# Patient Record
Sex: MALE
[De-identification: names, ages, dates, MRNs, and addresses within clinical notes are randomized per-mention and may not be internally consistent; named-entity substitution may affect disease eponyms.]

---

## 2017-03-29 ENCOUNTER — HOSPITAL ENCOUNTER (EMERGENCY)
Dept: HOSPITAL 14 - H.ER | Age: 32
Discharge: HOME | End: 2017-03-29
Payer: MEDICAID

## 2017-03-29 VITALS
TEMPERATURE: 98.4 F | RESPIRATION RATE: 20 BRPM | SYSTOLIC BLOOD PRESSURE: 112 MMHG | DIASTOLIC BLOOD PRESSURE: 76 MMHG | OXYGEN SATURATION: 98 % | HEART RATE: 87 BPM

## 2017-03-29 VITALS — BODY MASS INDEX: 33 KG/M2

## 2017-03-29 DIAGNOSIS — M54.9: Primary | ICD-10-CM

## 2017-03-29 NOTE — ED PDOC
HPI: Back


Time Seen by Provider: 03/29/17 12:25


Chief Complaint (Nursing): Back Pain


Chief Complaint (Provider): Back pain


History Per: Patient


History/Exam Limitations: no limitations


Onset/Duration Of Symptoms: Days (x2 weeks)


Current Symptoms Are (Timing): Still Present


Additional Complaint(s): 


32 y/o male who presents to the emergency department with a complaint of a back 

pain x2 weeks. Denies fever, chills, nausea, vomiting, or complications with 

urination.





Of note, previous CT scan show that patient has a herniated disc. 


 





Past Medical History


Reviewed: Historical Data, Nursing Documentation, Vital Signs


Vital Signs: 


 Last Vital Signs











Temp  98.4 F   03/29/17 10:52


 


Pulse  87   03/29/17 10:52


 


Resp  20   03/29/17 10:52


 


BP  112/76   03/29/17 10:52


 


Pulse Ox  98   03/29/17 10:52














- Medical History


PMH: No Chronic Diseases


   Denies: Chronic Kidney Disease





- Family History


Family History: States: Unknown Family Hx





- Social History


Current smoker - smoking cessation education provided: Yes (Light Smoker < 10 

Cigarettes Daily)


Alcohol: None


Drugs: Denies





- Immunization History


Hx Tetanus Toxoid Vaccination: Yes (couple months ago)


Hx Influenza Vaccination: Yes


Hx Pneumococcal Vaccination: Yes





- Home Medications


Home Medications: 


 Ambulatory Orders











 Medication  Instructions  Recorded


 


Pseudoephedrine HCl [Sudafed] 60 mg PO Q6 #30 tablet 01/11/17


 


Cyclobenzaprine [Cyclobenzaprine 10 mg PO BID #14 tab 03/29/17





HCl]  


 


Ketorolac Tromethamine [Toradol] 10 mg PO TID #20 tab 03/29/17














- Allergies


Allergies/Adverse Reactions: 


 Allergies











Allergy/AdvReac Type Severity Reaction Status Date / Time


 


No Known Allergies Allergy   Verified 03/29/17 10:51














Review of Systems


ROS Statement: Except As Marked, All Systems Reviewed And Found Negative


Constitutional: Negative for: Fever, Chills


Gastrointestinal: Negative for: Nausea, Vomiting


Genitourinary Male: Negative for: Dysuria, Frequency, Hematuria


Musculoskeletal: Positive for: Back Pain





Physical Exam





- Reviewed


Nursing Documentation Reviewed: Yes


Vital Signs Reviewed: Yes





- Physical Exam


Appears: Positive for: Non-toxic, No Acute Distress


Head Exam: Positive for: ATRAUMATIC, NORMOCEPHALIC


Skin: Positive for: Normal Color, Warm, Dry


Back: Positive for: L CVA Tenderness, R CVA Tenderness.  Negative for: Other (

No midline tenderness)


Neurologic/Psych: Positive for: Alert, Oriented





- Laboratory Results


Urine dip results: Negative for: Leukocyte Esterase, Blood, Nitrate, Ketones, 

Glucose, Bilirubin, Protein





- ECG


O2 Sat by Pulse Oximetry: 98 (RA)


Pulse Ox Interpretation: Normal





Medical Decision Making


Medical Decision Making: 


Time: 12:25


Initial impression: Exacerbation of herniated disc





Initial plan: 


--ED Urine Dipstick (POC)


--Flexeril 10 mg PO


--Toradol 30 mg IM


--Revaluation








Time: 12:45





Upon provider reevaluation patient is feeling better, is medically stable, and 

requires no further treatment in the ED at this time. Patient will be 

discharged home. Counseling was provided and all questions were answered 

regarding diagnosis and need for follow up with pain management referrals. 

There is agreement to discharge plan. Return if symptoms persist or worsen.











Scribe Attestation:


Documented by Sarah Leos, acting as a scribe for Marci Bell PA-C.





Provider Scribe Attestation:


All medical record entries made by the Scribe were at my direction and 

personally dictated by me. I have reviewed the chart and agree that the record 

accurately reflects my personal performance of the history, physical exam, 

medical decision making, and the department course for this patient. I have 

also personally directed, reviewed, and agree with the discharge instructions 

and disposition.











Disposition





- Clinical Impression


Clinical Impression: 


 Back pain





- Patient ED Disposition


Is Patient to be Admitted: No


Counseled Patient/Family Regarding: Studies Performed, Diagnosis, Need For 

Followup, Rx Given





- Disposition


Referrals: 


[facility] [Provider Group]


[facility] [Provider Group]


 Service [Outside]


Antione Hale MD [IM] - 


Disposition: Routine/Home


Disposition Time: 13:23


Condition: STABLE


Prescriptions: 


Cyclobenzaprine [Cyclobenzaprine HCl] 10 mg PO BID #14 tab


Ketorolac Tromethamine [Toradol] 10 mg PO TID #20 tab


Instructions:  Lumbar Disc Herniation (ED)

## 2017-08-22 ENCOUNTER — HOSPITAL ENCOUNTER (EMERGENCY)
Dept: HOSPITAL 42 - ED | Age: 32
Discharge: HOME | End: 2017-08-22
Payer: MEDICAID

## 2017-08-22 VITALS
TEMPERATURE: 98.6 F | OXYGEN SATURATION: 96 % | SYSTOLIC BLOOD PRESSURE: 134 MMHG | RESPIRATION RATE: 18 BRPM | HEART RATE: 79 BPM | DIASTOLIC BLOOD PRESSURE: 53 MMHG

## 2017-08-22 VITALS — BODY MASS INDEX: 33 KG/M2

## 2017-08-22 DIAGNOSIS — Z11.4: Primary | ICD-10-CM
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
## 2018-02-04 ENCOUNTER — HOSPITAL ENCOUNTER (EMERGENCY)
Dept: HOSPITAL 42 - ED | Age: 33
LOS: 1 days | Discharge: HOME | End: 2018-02-05
Payer: COMMERCIAL

## 2018-02-04 VITALS — BODY MASS INDEX: 38.7 KG/M2

## 2018-02-04 DIAGNOSIS — M79.672: ICD-10-CM

## 2018-02-04 DIAGNOSIS — F17.210: ICD-10-CM

## 2018-02-04 DIAGNOSIS — J11.1: Primary | ICD-10-CM

## 2018-02-04 DIAGNOSIS — K85.90: ICD-10-CM

## 2018-02-04 LAB
ALBUMIN SERPL-MCNC: 4.1 G/DL (ref 3–4.8)
ALBUMIN/GLOB SERPL: 1.3 {RATIO} (ref 1.1–1.8)
ALT SERPL-CCNC: 27 U/L (ref 7–56)
AST SERPL-CCNC: 27 U/L (ref 17–59)
BASOPHILS # BLD AUTO: 0.02 K/MM3 (ref 0–2)
BASOPHILS NFR BLD: 0.4 % (ref 0–3)
BUN SERPL-MCNC: 9 MG/DL (ref 7–21)
CALCIUM SERPL-MCNC: 9.5 MG/DL (ref 8.4–10.5)
EOSINOPHIL # BLD: 0.1 10*3/UL (ref 0–0.7)
EOSINOPHIL NFR BLD: 0.9 % (ref 1.5–5)
ERYTHROCYTE [DISTWIDTH] IN BLOOD BY AUTOMATED COUNT: 12.6 % (ref 11.5–14.5)
GFR NON-AFRICAN AMERICAN: > 60
GRANULOCYTES # BLD: 3.91 10*3/UL (ref 1.4–6.5)
GRANULOCYTES NFR BLD: 72.1 % (ref 50–68)
HGB BLD-MCNC: 14.8 G/DL (ref 14–18)
LIPASE SERPL-CCNC: 455 U/L (ref 23–300)
LYMPHOCYTES # BLD: 0.9 10*3/UL (ref 1.2–3.4)
LYMPHOCYTES NFR BLD AUTO: 17 % (ref 22–35)
MCH RBC QN AUTO: 33.6 PG (ref 25–35)
MCHC RBC AUTO-ENTMCNC: 34.5 G/DL (ref 31–37)
MCV RBC AUTO: 97.3 FL (ref 80–105)
MONOCYTES # BLD AUTO: 0.5 10*3/UL (ref 0.1–0.6)
MONOCYTES NFR BLD: 9.6 % (ref 1–6)
PLATELET # BLD: 212 10^3/UL (ref 120–450)
PMV BLD AUTO: 10 FL (ref 7–11)
RBC # BLD AUTO: 4.41 10^6/UL (ref 3.5–6.1)
TROPONIN I SERPL-MCNC: < 0.01 NG/ML
WBC # BLD AUTO: 5.4 10^3/UL (ref 4.5–11)

## 2018-02-04 PROCEDURE — 96374 THER/PROPH/DIAG INJ IV PUSH: CPT

## 2018-02-04 PROCEDURE — 83615 LACTATE (LD) (LDH) ENZYME: CPT

## 2018-02-04 PROCEDURE — 73630 X-RAY EXAM OF FOOT: CPT

## 2018-02-04 PROCEDURE — 96361 HYDRATE IV INFUSION ADD-ON: CPT

## 2018-02-04 PROCEDURE — 80053 COMPREHEN METABOLIC PANEL: CPT

## 2018-02-04 PROCEDURE — 83690 ASSAY OF LIPASE: CPT

## 2018-02-04 PROCEDURE — 71045 X-RAY EXAM CHEST 1 VIEW: CPT

## 2018-02-04 PROCEDURE — 93005 ELECTROCARDIOGRAM TRACING: CPT

## 2018-02-04 PROCEDURE — 80320 DRUG SCREEN QUANTALCOHOLS: CPT

## 2018-02-04 PROCEDURE — 84484 ASSAY OF TROPONIN QUANT: CPT

## 2018-02-04 PROCEDURE — 74177 CT ABD & PELVIS W/CONTRAST: CPT

## 2018-02-04 PROCEDURE — 87804 INFLUENZA ASSAY W/OPTIC: CPT

## 2018-02-04 PROCEDURE — 82550 ASSAY OF CK (CPK): CPT

## 2018-02-04 PROCEDURE — 99283 EMERGENCY DEPT VISIT LOW MDM: CPT

## 2018-02-04 PROCEDURE — 85025 COMPLETE CBC W/AUTO DIFF WBC: CPT

## 2018-02-04 PROCEDURE — 96375 TX/PRO/DX INJ NEW DRUG ADDON: CPT

## 2018-02-04 NOTE — ED PDOC
Arrival/HPI





- General


Chief Complaint: Chest Pain


Time Seen by Provider: 02/04/18 22:34


Historian: Patient





- History of Present Illness


Narrative History of Present Illness (Text): 





02/04/18 22:46


31 y/o male, no significant pmh, nkda, c/o epigastric pain with nausea and 

vomiting x 6 hours.  Pt. stated that he has not been eating entire day, noted 

to have epigastric pain, no coughing or shortness of breath, no pain medication 

taken at home.  Pt. also complaining about the lt. foot pain which has been 

going on for the past 3 weeks, initially seen at another ER and has scheduled 

follow up to see the orthopedic podiatrist tomorrow, no calf pain, no numbness 

or tingling, no rash, no palpitation, no other medical or psychological 

complaints. 





Past Medical History





- Provider Review


Nursing Documentation Reviewed: Yes





- Past History


Past History: Non-Contributing





- Infectious Disease


Hx of Infectious Diseases: None





- Tetanus Immunization


Tetanus Immunization: >10 years Ago





- Past Medical History


Past Medical History: No Previous





- Cardiac


Hx Cardiac Disorders: No





- Pulmonary


Hx Respiratory Disorders: No





- Neurological


Hx Neurological Disorder: No





- HEENT


Hx HEENT Disorder: No





- Renal


Hx Renal Disorder: No





- Endocrine/Metabolic


Hx Endocrine Disorders: No





- Hematological/Oncological


Hx Blood Disorders: No





- Integumentary


Hx Dermatological Disorder: No





- Musculoskeletal/Rheumatological


Hx Back Pain: Yes





- Gastrointestinal


Hx Gastrointestinal Disorders: No





- Genitourinary/Gynecological


Hx Genitourinary Disorders: No





- Psychiatric


Hx Psychophysiologic Disorder: No


Hx Substance Use: No





- Surgical History


Hx Musculoskeletal Surgery: Yes (screw in Left foot)


Other/Comment: screws in left foot, plate in right hand





- Anesthesia


Hx Anesthesia: Yes


Hx Anesthesia Reactions: No


Hx Malignant Hyperthermia: No





- Suicidal Assessment


Feels Threatened In Home Enviroment: No





Family/Social History





- Physician Review


Nursing Documentation Reviewed: Yes


Family/Social History: Unknown Family HX


Smoking Status: Light Smoker < 10 Cigarettes Daily


Hx Alcohol Use: No


Hx Substance Use: No





Allergies/Home Meds


Allergies/Adverse Reactions: 


Allergies





avocado Allergy (Verified 02/04/18 22:27)


 ANAPHYLAXIS


latex Allergy (Verified 02/04/18 22:27)


 SWELLING











Review of Systems





- Review of Systems


Constitutional: absent: Fatigue, Fevers


Eyes: absent: Vision Changes


ENT: absent: Hearing Changes


Respiratory: absent: SOB, Cough


Cardiovascular: absent: Chest Pain


Gastrointestinal: Abdominal Pain, Nausea, Vomiting.  absent: Diarrhea


Musculoskeletal: Arthralgias.  absent: Back Pain, Myalgias


Skin: absent: Rash, Pruritis


Neurological: absent: Headache, Dizziness


Psychiatric: absent: Anxiety, Depression





Physical Exam


Vital Signs Reviewed: Yes


Vital Signs











  Temp Pulse Resp BP Pulse Ox


 


 02/05/18 02:33  99 F    


 


 02/05/18 02:11   80  16  126/61  96


 


 02/04/18 22:37  100.4 F H  100 H  18  133/79  97











Temperature: Febrile


Blood Pressure: Normal


Pulse: Tachycardic


Respiratory Rate: Normal


Appearance: Positive for: Well-Appearing, Non-Toxic, Comfortable


Pain Distress: Moderate


Mental Status: Positive for: Alert and Oriented X 3





- Systems Exam


Head: Present: Atraumatic, Normocephalic


Pupils: Present: PERRL


Extroacular Muscles: Present: EOMI


Conjunctiva: Present: Normal


Mouth: Present: Moist Mucous Membranes


Neck: Present: Normal Range of Motion


Respiratory/Chest: Present: Clear to Auscultation, Good Air Exchange.  No: 

Respiratory Distress, Accessory Muscle Use


Cardiovascular: Present: Regular Rate and Rhythm, Normal S1, S2.  No: Murmurs


Abdomen: Present: Tenderness (+epigastric tenderness, no cva tenderness), 

Normal Bowel Sounds.  No: Distention, Peritoneal Signs, Rebound, Guarding


Back: Present: Normal Inspection


Upper Extremity: Present: Normal Inspection.  No: Cyanosis, Edema


Lower Extremity: Present: Normal Inspection, Other (Lt. foot/ankle: +ttp on the 

lt. dorsum foot with no swelling, no ankle tenderness or swelling, negative 

eder and tena signs, FROM without limitation, sensation intact, motor 5/5, 

+DPPT pulses, capillary refill< 2 seconds, neurovascular intact. ).  No: Edema


Neurological: Present: GCS=15, CN II-XII Intact, Speech Normal


Skin: Present: Warm, Dry, Normal Color.  No: Rashes


Psychiatric: Present: Alert, Oriented x 3, Normal Insight, Normal Concentration





Medical Decision Making


ED Course and Treatment: 





02/04/18 22:50


-labs/cardiac enzyme


-CT abdomen and pelvis


-Lt. foot xray


-IVF/pepcid/zofran/tylenol/toradol


-Observe and reassess








02/05/18 01:09


-Chest xray show no active disease


-Lt. foot show no acute fracture or dislocation


-CT abdomen and pelvis show No evidence of a significant acute process. 

Findings suspicious for a portosystemic collateral vessel, and cannot exclude 

portosystemic shunting due to portal hypertension. CT appearance of the liver 

is within normal limits. Recommend clinical correlation.


-Labs show no acute findings except lipase 455, morphine 4mg IV ordered as he 

still has epigastric pain.


-Rapid flu show is positive, tamiflu ordered. 





02/05/18 02:23


-Posterior splint applied by me with neurovascular intact, crutches ordered.


-Pain well controlled and the patient request to be discharged home with 

outpatient follow up as he doesn't want to be admitted. 


-I discussed with the patient about the light and non-greasy food diet plus 

outpatient GI follow up, stop all alcohol drinks.


-I checked the [facility] report and there is no prescription for narcotics for this 

patient. Pt. stated that he has appointment with his podiatrist tomorrow and he 

will follow up. 


-Discharge home with posterior splint, crutches, Tamiflu, motrin, pepcid, zofran

, percocet, avoid alcohol and non-greasy food, follow up with your own pmd and  

GI within 2 days, return to the ER for any new or worsening signs or symptoms. 








I educated the patient about the diet as well including but not limited to





Foods to limit include:





red meat


organ meats


fried foods


fries and potato chips


mayonnaise


margarine and butter


full-fat dairy


pastries and desserts with added sugars


beverages with added sugars


If youre trying to combat pancreatitis, your focus needs to be on avoiding 

trans fatty acids in your diet. Foods that are fried or heavily processed, like 

french fries and fast food hamburgers, are some of the worst offenders. Organ 

meats, full-fat dairy, potato chips, and mayonnaise also top the list of foods 

to limit.





Anything that has been cooked or deep-fried might trigger a flare-up of 

pancreatitis. Youll want to cut way back on the refined flour thats in cakes, 

pastries, and cookies. These food items can tax the digestive system by causing 

your insulin levels to spike, which can lead to diabetes.





RECOVERY DIET


Pancreatitis recovery diet


If youre recovering from acute or chronic pancreatitis, you should avoid 

drinking alcohol. If you smoke, youll also need to quit. You should continue to 

focus on eating a low-fat diet that wont tax or inflame your pancreas. You 

should also focus on staying hydrated, keeping an electrolyte beverage or a 

bottle of water with you at all times. If youve been hospitalized due to a 

pancreatitis flare-up, your doctor will probably refer you to a dietitian to 

help you learn how to change your eating habits permanently.





People with chronic pancreatitis often experience malnutrition due to their 

decreased pancreas function. Vitamins A, D, E, and K are most commonly found to 

be lacking as a result of pancreatitis.





Diet tips


Always check with your doctor or dietician before changing your eating habits 

when you have pancreatitis. Here are some tips they might suggest:





Eat between six and eight small meals throughout the day to help recover from 

pancreatitis. This is easier on your digestive system than eating two or three 

large meals.


Use MCTs as your primary fat since this type of fat does not require pancreatic 

enzymes to be digested. MCTs can be found in coconut and palm kernel oils and 

is available at most health food stores.


Avoid eating too much fiber at once, as this can slow digestion and result in 

less-than-ideal absorption of nutrients from food. Fiber may also make your 

limited amount of enzymes less effective.


Take a multivitamin supplement to ensure that youre getting the nutrition you 

need, regardless of the ways you might have to restrict your eating.





CAUSES


Causes of pancreatitis


The most common cause of chronic pancreatitis is drinking too much alcohol, 

according to the [place] Department of Health and Human Services. Pancreatitis can 

also be genetic, or the symptom of an autoimmune reaction. In many cases of 

acute pancreatitis, the condition is triggered by a blocked bile duct or 

gallstones.








TREATMENT


Other treatments for pancreatitis


If your pancreas has been damaged by pancreatitis, a change in your diet will 

help you to feel better. But it might not be enough to restore the function of 

the pancreas completely. Your doctor may prescribe supplemental or synthetic 

pancreatic enzymes for you to take with every meal. If youre still experiencing 

pain from chronic pancreatitis, you might want to try an alternative therapy 

such as yoga or acupuncture to supplement your doctors prescribed pancreatitis 

treatment. An endoscopic ultrasound or a surgery might be recommended as the 

next course of action if your pain continues.




















- Lab Interpretations


Lab Results: 








 02/04/18 22:45 





 02/04/18 22:45 





 Lab Results





02/05/18 00:50: Influenza Typ A,B (EIA) Pos for influenza a H


02/04/18 22:45: WBC 5.4, RBC 4.41, Hgb 14.8, Hct 42.9, MCV 97.3, MCH 33.6, MCHC 

34.5, RDW 12.6, Plt Count 212, MPV 10.0, Gran % 72.1 H, Lymph % (Auto) 17.0 L, 

Mono % (Auto) 9.6 H, Eos % (Auto) 0.9 L, Baso % (Auto) 0.4, Gran # 3.91, Lymph 

# (Auto) 0.9 L, Mono # (Auto) 0.5, Eos # (Auto) 0.1, Baso # (Auto) 0.02


02/04/18 22:45: Alcohol, Quantitative < 10


02/04/18 22:45: Sodium 138, Potassium 3.9, Chloride 103, Carbon Dioxide 24, 

Anion Gap 15, BUN 9, Creatinine 0.8, Est GFR (African Amer) > 60, Est GFR (Non-

Af Amer) > 60, Random Glucose 102, Calcium 9.5, Total Bilirubin 0.5, AST 27, 

ALT 27, Alkaline Phosphatase 51, Lactate Dehydrogenase 494, Total Creatine 

Kinase 89, Troponin I < 0.01, Total Protein 7.3, Albumin 4.1, Globulin 3.2, 

Albumin/Globulin Ratio 1.3, Lipase 455 H











- RAD Interpretation


Radiology Orders: 








02/04/18 22:39


CHEST PORTABLE [RAD] Stat 


FOOT LEFT 3 VIEWS ROUTINE [RAD] Stat 





02/04/18 22:41


ABD & PELVIS IV CONTRAST ONLY [CT] Stat 











Chest xray: no active disease


--------------------------------------------------------------------------------

--


Lt. foot xray: no acute findings


--------------------------------------------------------------------------------

---


CT abdomen and pelvis: 





FINDINGS:


LOWER THORAX: Heart appears mildly prominent in size for age. Lung bases are 

clear.


ABDOMEN:


LIVER: Within normal limits in appearance.


GALLBLADDER AND BILE DUCTS: No CT evidence of acute cholecystitis. No evidence 

of


significant biliary ductal dilatation.


PANCREAS: No CT evidence of acute pancreatitis.


SPLEEN: No acute abnormality of the spleen identified.


ADRENALS: No acute abnormality of the adrenal glands identified.


KIDNEYS AND URETERS: No acute abnormality of the kidneys identified. No 

evidence of


significant hydrouereteronephrosis.


LAURI JORGE Accession: [id number] MRN:[id number] | Final Radiology 

Report


CONFIDENTIALITY STATEMENT


This report is intended only for use by the referring physician, and only in 

accordance with law. If you received this in error, call [phone].


Page 2 of 2


STOMACH AND BOWEL: Retained stool noted throughout the colon. Bowel is otherwise


unremarkable in appearance. No evidence of fecal impaction. No evidence of 

bowel obstruction.


APPENDIX: Appendix is seen, and is within normal limits in appearance.


PELVIS:


BLADDER: No acute abnormality of the bladder identified.


REPRODUCTIVE: No acute abnormality of the reproductive organs is seen.


ABDOMEN and PELVIS:


INTRAPERITONEAL SPACE: No evidence of free intraperitoneal air or fluid.


BONES/JOINTS: No acute fractures or other acute bony abnormality noted.


SOFT TISSUES: No acute abnormality of the visualized soft tissues is seen.


VASCULATURE: A prominent vessel is seen in the upper abdomen, which extends 

from the main


portal vein to the left renal vein. This is suspicious for a portosystemic 

collateral vessel. The liver


does not appear significantly cirrhotic. The portal vein is patent.


LYMPH NODES: No evidence of diffuse lymphadenopathy.


IMPRESSION:


- No evidence of a significant acute process.


- Findings suspicious for a portosystemic collateral vessel, and cannot exclude 

portosystemic


shunting due to portal hypertension. CT appearance of the liver is within 

normal limits. Recommend


clinical correlation.


- See above for remaining findings.


Thank you for allowing us to participate in the care of your patient.


Dictated and Authenticated by: Marylou Elam MD


02/05/2018 12:39 AM Eastern Time ([place] & [place])


: Radiologist





- Medication Orders


Current Medication Orders: 











Discontinued Medications





Acetaminophen (Tylenol 325mg Tab)  650 mg PO STAT STA


   Stop: 02/04/18 22:50


   Last Admin: 02/04/18 22:58  Dose: 650 mg





MAR Pain/Vitals


 Document     02/04/18 22:58  RD  (Rec: 02/04/18 22:58  RD  [id number])


     Pain Reassessment


      Is This A Pain ReAssessment?               No


     Sleep


      Is patient sleeping during reassessment?   No


     Presence of Pain


      Presence of Pain                           Yes





Famotidine (Pepcid)  20 mg IVP STAT STA


   Stop: 02/04/18 22:40


   Last Admin: 02/04/18 22:58  Dose: 20 mg





IVP Administration


 Document     02/04/18 22:58  RD  (Rec: 02/04/18 22:58  RD  [id number])


     Charges for Administration


      # of IVP Administrations                   1





Sodium Chloride (Sodium Chloride 0.9%)  1,000 mls @ 999 mls/hr IV .Q1H1M STA


   Stop: 02/04/18 23:40


   Last Admin: 02/04/18 22:45  Dose: 999 mls/hr





eMAR Start Stop


 Document     02/04/18 22:45  RD  (Rec: 02/04/18 23:00  RD  [id number])


     Intravenous Solution


      Start Date                                 02/04/18


      Start Time                                 23:00


      End Date                                   02/05/18


      End time                                   00:00


      Total Infusion Time                        60





Ketorolac Tromethamine (Toradol)  30 mg IVP STAT STA


   Stop: 02/04/18 22:41


   Last Admin: 02/04/18 22:59  Dose: 30 mg





MAR Pain Assessment


 Document     02/04/18 22:59  RD  (Rec: 02/04/18 22:59  RD  [id number])


     Pain Reassessment


      Is this a pain reassessment?               No


     Sleep


      Is patient sleeping during reassessment?   No


     Presence of Pain


      Presence of Pain                           Yes


IVP Administration


 Document     02/04/18 22:59  RD  (Rec: 02/04/18 22:59  RD  [id number])


     Charges for Administration


      # of IVP Administrations                   1





Morphine Sulfate (Morphine)  4 mg IVP STAT STA


   Stop: 02/05/18 00:53


   Last Admin: 02/05/18 01:51  Dose: 4 mg





IVP Administration


 Document     02/05/18 01:51  RD  (Rec: 02/05/18 01:52  RD  [id number])


     Charges for Administration


      # of IVP Administrations                   1





Ondansetron HCl (Zofran Inj)  4 mg IVP STAT STA


   Stop: 02/04/18 22:41


   Last Admin: 02/04/18 22:54  Dose: 4 mg





IVP Administration


 Document     02/04/18 22:54  RD  (Rec: 02/04/18 22:59  RD  [id number])


     Charges for Administration


      # of IVP Administrations                   1





Oseltamivir Phosphate (Tamiflu Cap)  75 mg PO BID PAULA


   PRN Reason: Protocol


   Stop: 02/10/18 01:18


   Last Admin: 02/05/18 01:52  Dose: 75 mg











- PA / NP / Resident Statement


MD/DO has reviewed & agrees with the documentation as recorded.





Disposition/Present on Arrival





- Present on Arrival


Any Indicators Present on Arrival: Yes


History of DVT/PE: No


History of Uncontrolled Diabetes: No


Urinary Catheter: No


History of Decub. Ulcer: No


History Surgical Site Infection Following: None





- Disposition


Have Diagnosis and Disposition been Completed?: Yes


Diagnosis: 


 Pancreatitis, Foot pain, Influenza





Disposition: HOME/ ROUTINE


Disposition Time: 02:02


Patient Plan: Discharge


Condition: IMPROVED


Discharge Instructions (ExitCare):  Pancreatitis (ED)


Print Language: ENGLISH


Additional Instructions: 


-Discharge home with Tamiflu, motrin, pepcid, zofran, percocet, avoid alcohol 

and non-greasy food, follow up with your own pmd and GI within 2 days, return 

to the ER for any new or worsening signs or symptoms. 


Prescriptions: 


Famotidine [Pepcid] 20 mg PO BID #20 tab


Ibuprofen [Motrin] 600 mg PO QID PRN #24 tab


 PRN Reason: Other


Ondansetron HCl [Zofran] 4 mg PO QID PRN #12 tablet


 PRN Reason: Other


Oseltamivir Phosphate [Tamiflu] 75 mg PO BID #10 capsule


oxyCODONE/Acetaminophen [Percocet 5/325 mg Tab] 1 tab PO QID PRN #12 tab


 PRN Reason: Other


Referrals: 


Lavelle Webb MD [Primary Care Provider] - Follow up with primary


Rafal Teague MD [Staff Provider] - Follow up with primary


Olivier Campo III, MD [Medical Doctor] - Follow up with primary


Arloro,Vincent, DPM [Staff Provider] - Follow up with primary


Forms:  WORK NOTE

## 2018-02-05 VITALS
RESPIRATION RATE: 16 BRPM | SYSTOLIC BLOOD PRESSURE: 126 MMHG | OXYGEN SATURATION: 96 % | HEART RATE: 80 BPM | DIASTOLIC BLOOD PRESSURE: 61 MMHG

## 2018-02-05 VITALS — TEMPERATURE: 99 F

## 2018-02-05 NOTE — RAD
HISTORY:

epigastric pain  



COMPARISON:

01/11/2017 



FINDINGS:



LUNGS:

No active pulmonary disease.



PLEURA:

No significant pleural effusion identified, no pneumothorax apparent.



CARDIOVASCULAR:

Normal.



OSSEOUS STRUCTURES:

No significant abnormalities.



VISUALIZED UPPER ABDOMEN:

Normal.



OTHER FINDINGS:

None.



IMPRESSION:

No active disease.

## 2018-02-05 NOTE — CARD
--------------- APPROVED REPORT --------------





EKG Measurement

Heart [id number]

[id number]

[id number]

[id number]

[id number]



<Conclusion>

Normal sinus rhythm

Normal ECG

## 2018-02-05 NOTE — CT
EXAM:

  CT Abdomen and Pelvis With Intravenous Contrast



EXAM DATE/TIME:

  2/4/2018 10:41 PM



CLINICAL HISTORY:

  32 years old, male; Pain; Abdominal pain; Generalized; Additional info: Upper 

abdominal pain



TECHNIQUE:

  Axial computed tomography images of the abdomen and pelvis with intravenous 

contrast.  All CT scans at this facility use one or more dose reduction 

techniques, viz.: automated exposure control; ma/kV adjustment per patient size 

(including targeted exams where dose is matched to indication; i.e. head); or 

iterative reconstruction technique.

  Coronal and sagittal reformatted images were created and reviewed.



CONTRAST:

  100 mL of OMNI 350 administered intravenously.



COMPARISON:

  No relevant prior studies available.



FINDINGS:

  LOWER THORAX:  Heart appears mildly prominent in size for age. Lung bases are 

clear.



 ABDOMEN:

  LIVER:   Within normal limits in appearance. 

  GALLBLADDER AND BILE DUCTS:  No CT evidence of acute cholecystitis.  No 

evidence of significant biliary ductal dilatation.

  PANCREAS:  No CT evidence of acute pancreatitis.

  SPLEEN:  No acute abnormality of the spleen identified.

  ADRENALS:  No acute abnormality of the adrenal glands identified.

  KIDNEYS AND URETERS:  No acute abnormality of the kidneys identified. No 

evidence of significant hydrouereteronephrosis.

  STOMACH AND BOWEL:  Retained stool noted throughout the colon.  Bowel is 

otherwise unremarkable in appearance.  No evidence of fecal impaction.  No 

evidence of bowel obstruction.

  APPENDIX:  Appendix is seen, and is within normal limits in appearance.



 PELVIS:

  BLADDER:  No acute abnormality of the bladder identified.

  REPRODUCTIVE:  No acute abnormality of the reproductive organs is seen.



 ABDOMEN and PELVIS:

  INTRAPERITONEAL SPACE:  No evidence of free intraperitoneal air or fluid.

  BONES/JOINTS:  No acute fractures or other acute bony abnormality noted.

  SOFT TISSUES:  No acute abnormality of the visualized soft tissues is seen.

  VASCULATURE:  A prominent vessel is seen in the upper abdomen, which extends 

from the main portal vein to the left renal vein. This is suspicious for a 

portosystemic collateral vessel.  The liver does not appear significantly 

cirrhotic. The portal vein is patent. 

  LYMPH NODES:  No evidence of diffuse lymphadenopathy.



IMPRESSION:     

- No evidence of a significant acute process.

- Findings suspicious for a portosystemic collateral vessel, and cannot exclude 

portosystemic shunting due to portal hypertension. CT appearance of the liver 

is within normal limits. Recommend clinical correlation.

- See above for remaining findings.

## 2018-02-05 NOTE — RAD
PROCEDURE:  Left Foot Radiographs.



HISTORY:

lt. foot pain  



COMPARISON:

None.



FINDINGS:



BONES:

Normal. No fracture. 



JOINTS:

Normal. 



SOFT TISSUES:

Normal. 



OTHER FINDINGS:

An orthopedic screw is seen in the base of the 5th metatarsal



IMPRESSION:

No acute findings

## 2018-05-14 ENCOUNTER — HOSPITAL ENCOUNTER (EMERGENCY)
Dept: HOSPITAL 42 - ED | Age: 33
LOS: 1 days | Discharge: HOME | End: 2018-05-15
Payer: COMMERCIAL

## 2018-05-14 VITALS — BODY MASS INDEX: 37.3 KG/M2

## 2018-05-14 VITALS — RESPIRATION RATE: 18 BRPM

## 2018-05-14 DIAGNOSIS — R10.13: Primary | ICD-10-CM

## 2018-05-14 DIAGNOSIS — F17.210: ICD-10-CM

## 2018-05-14 LAB
ALBUMIN SERPL-MCNC: 4.3 G/DL (ref 3–4.8)
ALBUMIN/GLOB SERPL: 1.4 {RATIO} (ref 1.1–1.8)
ALT SERPL-CCNC: 26 U/L (ref 7–56)
APTT BLD: 29.6 SECONDS (ref 25.1–36.5)
AST SERPL-CCNC: 25 U/L (ref 17–59)
BASOPHILS # BLD AUTO: 0.01 K/MM3 (ref 0–2)
BASOPHILS NFR BLD: 0.2 % (ref 0–3)
BUN SERPL-MCNC: 7 MG/DL (ref 7–21)
CALCIUM SERPL-MCNC: 9.3 MG/DL (ref 8.4–10.5)
EOSINOPHIL # BLD: 0.1 10*3/UL (ref 0–0.7)
EOSINOPHIL NFR BLD: 1.3 % (ref 1.5–5)
ERYTHROCYTE [DISTWIDTH] IN BLOOD BY AUTOMATED COUNT: 13.4 % (ref 11.5–14.5)
GFR NON-AFRICAN AMERICAN: > 60
GRANULOCYTES # BLD: 3.65 10*3/UL (ref 1.4–6.5)
GRANULOCYTES NFR BLD: 57 % (ref 50–68)
HGB BLD-MCNC: 15.5 G/DL (ref 14–18)
INR PPP: 1.09 (ref 0.93–1.08)
LIPASE SERPL-CCNC: 77 U/L (ref 23–300)
LYMPHOCYTES # BLD: 2.2 10*3/UL (ref 1.2–3.4)
LYMPHOCYTES NFR BLD AUTO: 34.2 % (ref 22–35)
MCH RBC QN AUTO: 33.5 PG (ref 25–35)
MCHC RBC AUTO-ENTMCNC: 36 G/DL (ref 31–37)
MCV RBC AUTO: 93.1 FL (ref 80–105)
MONOCYTES # BLD AUTO: 0.5 10*3/UL (ref 0.1–0.6)
MONOCYTES NFR BLD: 7.3 % (ref 1–6)
PLATELET # BLD: 252 10^3/UL (ref 120–450)
PMV BLD AUTO: 9.7 FL (ref 7–11)
PROTHROMBIN TIME: 12.4 SECONDS (ref 9.4–12.5)
RBC # BLD AUTO: 4.63 10^6/UL (ref 3.5–6.1)
WBC # BLD AUTO: 6.4 10^3/UL (ref 4.5–11)

## 2018-05-14 PROCEDURE — 76700 US EXAM ABDOM COMPLETE: CPT

## 2018-05-14 PROCEDURE — 83735 ASSAY OF MAGNESIUM: CPT

## 2018-05-14 PROCEDURE — 99284 EMERGENCY DEPT VISIT MOD MDM: CPT

## 2018-05-14 PROCEDURE — 85730 THROMBOPLASTIN TIME PARTIAL: CPT

## 2018-05-14 PROCEDURE — 96375 TX/PRO/DX INJ NEW DRUG ADDON: CPT

## 2018-05-14 PROCEDURE — 85025 COMPLETE CBC W/AUTO DIFF WBC: CPT

## 2018-05-14 PROCEDURE — 96361 HYDRATE IV INFUSION ADD-ON: CPT

## 2018-05-14 PROCEDURE — 83690 ASSAY OF LIPASE: CPT

## 2018-05-14 PROCEDURE — 96374 THER/PROPH/DIAG INJ IV PUSH: CPT

## 2018-05-14 PROCEDURE — 80053 COMPREHEN METABOLIC PANEL: CPT

## 2018-05-14 PROCEDURE — 85610 PROTHROMBIN TIME: CPT

## 2018-05-14 NOTE — ED PDOC
Arrival/HPI





- General


Chief Complaint: Abdominal Pain


Time Seen by Provider: 05/14/18 22:38


Historian: Patient





- History of Present Illness


Narrative History of Present Illness (Text): 





05/14/18 23:00


This 33 yo male with pmh pancreatitis, presents to this ED c/o RUQ abdominal 

pain x 2 days.  Patient stated he was diagnosed with pancreatitis x 3 months 

ago.  Patient stated abdominal pain has been intermittent, but it worsen 2 days 

ago.  Patient stated he vomited 2 times today.  Patient noted trace rectal 

bleeding x 2 weeks ago, but it resolved same day.  Denies rectal bleeding at 

this time.


Time/Duration: Other (see hpi)


Context: Home





Past Medical History





- Provider Review


Nursing Documentation Reviewed: Yes





- Past History


Past History: Non-Contributing





- Infectious Disease


Hx of Infectious Diseases: None





- Tetanus Immunization


Tetanus Immunization: >10 years Ago





- Past Medical History


Past Medical History: No Previous





- Cardiac


Hx Cardiac Disorders: No





- Pulmonary


Hx Respiratory Disorders: No





- Neurological


Hx Neurological Disorder: No





- HEENT


Hx HEENT Disorder: No





- Renal


Hx Renal Disorder: No





- Endocrine/Metabolic


Hx Endocrine Disorders: No





- Hematological/Oncological


Hx Blood Disorders: No





- Integumentary


Hx Dermatological Disorder: No





- Musculoskeletal/Rheumatological


Hx Back Pain: Yes





- Gastrointestinal


Hx Gastrointestinal Disorders: No





- Genitourinary/Gynecological


Hx Genitourinary Disorders: No





- Psychiatric


Hx Psychophysiologic Disorder: No


Hx Substance Use: Yes





- Surgical History


Hx Musculoskeletal Surgery: Yes (screw in Left foot)


Other/Comment: screws in left foot, plate in right hand





- Anesthesia


Hx Anesthesia: Yes


Hx Anesthesia Reactions: No


Hx Malignant Hyperthermia: No





- Suicidal Assessment


Feels Threatened In Home Enviroment: No





Family/Social History





- Physician Review


Nursing Documentation Reviewed: Yes


Family/Social History: Other (noncontributory)


Smoking Status: Light Smoker < 10 Cigarettes Daily


Hx Alcohol Use: No


Hx Substance Use: Yes


Substance used: marijuana





Allergies/Home Meds


Allergies/Adverse Reactions: 


Allergies





avocado Allergy (Verified 02/04/18 22:27)


 ANAPHYLAXIS


latex Allergy (Verified 02/04/18 22:27)


 SWELLING








Home Medications: 


 Home Meds











 Medication  Instructions  Recorded  Confirmed


 


Omeprazole [Omeprazole] 20 mg PO DAILY 05/15/18 05/15/18














Review of Systems





- Review of Systems


Constitutional: Normal.  absent: Fatigue, Fevers, Night Sweats


Eyes: Normal


ENT: Normal


Respiratory: Normal.  absent: SOB, Cough


Cardiovascular: Normal.  absent: Chest Pain, Palpitations


Gastrointestinal: Abdominal Pain, Nausea, Vomiting.  absent: Diarrhea


Genitourinary Male: Normal.  absent: Dysuria, Frequency, Hematuria


Musculoskeletal: Normal.  absent: Arthralgias, Back Pain, Neck Pain, Joint 

Swelling, Myalgias


Skin: Normal.  absent: Rash


Neurological: Normal.  absent: Headache, Dizziness, Focal Weakness, Gait Changes

, Speech Changes, Facial Droop, Disequilibrium, Seizure


Endocrine: Normal


Hemo/Lymphatic: Normal


Psychiatric: Normal





Physical Exam


Vital Signs











  Temp Pulse Resp BP Pulse Ox


 


 05/15/18 00:01  98.4 F  81  18  132/74  95


 


 05/14/18 22:26   99 H  18  160/95 H  100











Temperature: Afebrile


Blood Pressure: Normal


Pulse: Regular


Respiratory Rate: Normal


Appearance: Positive for: Well-Appearing, Non-Toxic, Comfortable


Pain Distress: None


Mental Status: Positive for: Alert and Oriented X 3





- Systems Exam


Head: Present: Atraumatic, Normocephalic


Pupils: Present: PERRL


Extroacular Muscles: Present: EOMI


Conjunctiva: Present: Normal


Mouth: Present: Moist Mucous Membranes


Neck: Present: Normal Range of Motion


Respiratory/Chest: Present: Clear to Auscultation, Good Air Exchange.  No: 

Respiratory Distress, Accessory Muscle Use


Cardiovascular: Present: Regular Rate and Rhythm, Normal S1, S2.  No: Murmurs


Abdomen: Present: Tenderness ((+) moderate tenderness on palpation on RUQ, and 

epigastric area.), Other (No RLQ tenderness noted.  No periumbilical tenderness)

.  No: Distention, Peritoneal Signs, Rebound, Guarding, McBurney's Point Tender

, Rovsing's Sign Present, Hernias


Back: Present: Normal Inspection.  No: CVA Tenderness


Upper Extremity: Present: Normal Inspection, Normal ROM.  No: Cyanosis, Edema


Lower Extremity: Present: Normal Inspection, Normal ROM.  No: Edema


Neurological: Present: GCS=15, CN II-XII Intact, Speech Normal, Motor Func 

Grossly Intact, Normal Sensory Function, Normal Cerebellar Funct, Gait Normal


Skin: Present: Warm, Dry, Normal Color.  No: Rashes


Psychiatric: Present: Alert, Oriented x 3, Normal Insight, Normal Concentration





Medical Decision Making


ED Course and Treatment: 





05/15/18 02:07


EXAM:


US Abdomen Complete





FINDINGS:


Liver: Unremarkable measuring 17 cm. No mass. No intrahepatic bile duct 

dilation.


Gallbladder: The gallbladder is contracted but otherwise normal. Negative Jean

's sign.


Common bile duct: Unremarkable as visualized measuring 3 mm. No stones. No 

dilation.


Pancreas: The pancreas is poorly-visualized due to overlying bowel gas.


Kidneys: Unremarkable. The right kidney measures 11.4 cm and the left kidney 

measures 9.8 cm.


No stones. No solid mass. No hydronephrosis.


Spleen: Unremarkable measuring 11.6 cm. No splenomegaly.


Aorta: Unremarkable. No aneurysm.


Inferior vena cava: Unremarkable.


IMPRESSION:


Unremarkable abdominal ultrasound.





05/15/18 02:10


Re-evaluation.  Patient feels better.  Discussed results and plan with patient 

who expresses understanding.  All questions answered and there is agreement 

with the plan to discharge home with instructions.  Patient stable for 

discharge.  Return if symptoms persist or worsen.


Patient was recommended to return to emergency if he develops pain on RLQ area 

or periumbilical area.


Abdomen is soft, nd, no RLQ or periumbilical tenderness.


Re-evaluation Time: 02:07


Reassessment Condition: Re-examined, Improved





- Lab Interpretations


Lab Results: 








 05/14/18 23:12 





 05/14/18 23:12 





 Lab Results





05/14/18 23:12: Sodium 144, Potassium 3.6, Chloride 104, Carbon Dioxide 26, 

Anion Gap 17, BUN 7, Creatinine 0.8, Est GFR (African Amer) > 60, Est GFR (Non-

Af Amer) > 60, Random Glucose 91, Calcium 9.3, Magnesium 1.9, Total Bilirubin 

0.4, AST 25, ALT 26, Alkaline Phosphatase 70, Total Protein 7.4, Albumin 4.3, 

Globulin 3.1, Albumin/Globulin Ratio 1.4, Lipase 77


05/14/18 23:12: PT 12.4, INR 1.09 H, APTT 29.6


05/14/18 23:12: WBC 6.4, RBC 4.63, Hgb 15.5, Hct 43.1, MCV 93.1  D, MCH 33.5, 

MCHC 36.0, RDW 13.4, Plt Count 252, MPV 9.7, Gran % 57.0, Lymph % (Auto) 34.2, 

Mono % (Auto) 7.3 H, Eos % (Auto) 1.3 L, Baso % (Auto) 0.2, Gran # 3.65, Lymph 

# (Auto) 2.2, Mono # (Auto) 0.5, Eos # (Auto) 0.1, Baso # (Auto) 0.01











- RAD Interpretation


Radiology Orders: 








05/14/18 22:57


ABDOMEN COMPLETE [US] Stat 














- Medication Orders


Current Medication Orders: 











Discontinued Medications





Sodium Chloride (Sodium Chloride 0.9%)  1,000 mls @ 1,000 mls/hr IV .Q1H STA


   Stop: 05/14/18 23:56


   Last Admin: 05/14/18 23:15  Dose: 1,000 mls/hr





eMAR Start Stop


 Document     05/14/18 23:15  LA  (Rec: 05/14/18 23:15  LA  [id number])


     Intravenous Solution


      Start Date                                 05/14/18


      Start Time                                 23:15


      End Date                                   05/15/18


      End time                                   00:15


      Total Infusion Time                        60





Famotidine (Pepcid 20mg/50ml Premix)  20 mg in 50 mls @ 200 mls/hr IVPB STAT STA


   Stop: 05/14/18 23:15


   Last Admin: 05/14/18 23:18  Dose: 200 mls/hr





eMAR Start Stop


 Document     05/14/18 23:18  LA  (Rec: 05/14/18 23:19  LA  [id number])


     Intravenous Solution


      Start Date                                 05/14/18


      Start Time                                 23:18


      End Date                                   05/14/18


      End time                                   23:33


      Total Infusion Time                        15





Ondansetron HCl (Zofran Inj)  4 mg IVP STAT STA


   Stop: 05/14/18 22:58


   Last Admin: 05/14/18 23:18  Dose: 4 mg





IVP Administration


 Document     05/14/18 23:18  LA  (Rec: 05/14/18 23:18  LA  [id number])


     Charges for Administration


      # of IVP Administrations                   1














Disposition/Present on Arrival





- Present on Arrival


Any Indicators Present on Arrival: No


History of DVT/PE: No


History of Uncontrolled Diabetes: No


Urinary Catheter: No


History of Decub. Ulcer: No


History Surgical Site Infection Following: None





- Disposition


Have Diagnosis and Disposition been Completed?: Yes


Diagnosis: 


 Epigastric abdominal pain





Disposition: HOME/ ROUTINE


Disposition Time: 02:13


Patient Plan: Discharge


Patient Problems: 


 Current Active Problems











Problem Status Onset


 


Epigastric abdominal pain Acute  











Condition: IMPROVED


Discharge Instructions (ExitCare):  Chronic Pain (DC)


Additional Instructions: 


Call private doctor for follow up visit in 1-2 days.  Take medication as 

instructed.  Return to emergency if abdominal pain radiates to right lower 

abdomen, fever or worsening of pain. or if you develop pain near umbilicus


Prescriptions: 


Sucralfate [Carafate] 1 gm PO DAILY #20 tab


Referrals: 


Jeff Underwood MD [Family Provider] - Follow up with primary


Forms:  [employer] (English), WORK NOTE

## 2018-05-15 VITALS
SYSTOLIC BLOOD PRESSURE: 130 MMHG | TEMPERATURE: 98 F | HEART RATE: 89 BPM | OXYGEN SATURATION: 100 % | DIASTOLIC BLOOD PRESSURE: 80 MMHG

## 2018-05-15 NOTE — US
EXAM:

  US Abdomen Complete



CLINICAL HISTORY:

  32 years old, male; Pain; Abdominal pain; Generalized; Additional info: Ruq 

pain



TECHNIQUE:

  Real-time ultrasound of the abdomen (complete) with image documentation.



COMPARISON:

  CT - ABD   PELVIS IV CONTRAST ONLY 2018-02-04 23:46



FINDINGS:

  Liver:  Unremarkable measuring 17 cm.  No mass.  No intrahepatic bile duct 

dilation.

  Gallbladder:  The gallbladder is contracted but otherwise normal. Negative 

Jean's sign.

  Common bile duct:  Unremarkable as visualized measuring 3 mm.  No stones.  No 

dilation.

  Pancreas:  The pancreas is poorly-visualized due to overlying bowel gas.

  Kidneys:  Unremarkable. The right kidney measures 11.4 cm and the left kidney 

measures 9.8 cm. No stones.  No solid mass.  No hydronephrosis.

  Spleen:  Unremarkable measuring 11.6 cm.  No splenomegaly.

  Aorta:  Unremarkable.  No aneurysm.

  Inferior vena cava:  Unremarkable.



IMPRESSION:     

Unremarkable abdominal ultrasound.